# Patient Record
Sex: MALE | Race: WHITE | Employment: FULL TIME | URBAN - METROPOLITAN AREA
[De-identification: names, ages, dates, MRNs, and addresses within clinical notes are randomized per-mention and may not be internally consistent; named-entity substitution may affect disease eponyms.]

---

## 2018-03-07 ENCOUNTER — OFFICE VISIT (OUTPATIENT)
Dept: PULMONOLOGY | Age: 58
End: 2018-03-07

## 2018-03-07 VITALS
TEMPERATURE: 98.7 F | BODY MASS INDEX: 41.52 KG/M2 | OXYGEN SATURATION: 97 % | SYSTOLIC BLOOD PRESSURE: 140 MMHG | HEIGHT: 70 IN | RESPIRATION RATE: 18 BRPM | DIASTOLIC BLOOD PRESSURE: 82 MMHG | HEART RATE: 77 BPM | WEIGHT: 290 LBS

## 2018-03-07 DIAGNOSIS — G47.33 OSA (OBSTRUCTIVE SLEEP APNEA): Primary | ICD-10-CM

## 2018-03-07 PROCEDURE — 99243 OFF/OP CNSLTJ NEW/EST LOW 30: CPT | Performed by: NURSE PRACTITIONER

## 2018-03-07 RX ORDER — COVID-19 ANTIGEN TEST
KIT MISCELLANEOUS
COMMUNITY

## 2018-03-07 ASSESSMENT — SLEEP AND FATIGUE QUESTIONNAIRES
HOW LIKELY ARE YOU TO NOD OFF OR FALL ASLEEP WHILE LYING DOWN TO REST IN THE AFTERNOON WHEN CIRCUMSTANCES PERMIT: 2
HOW LIKELY ARE YOU TO NOD OFF OR FALL ASLEEP WHILE SITTING INACTIVE IN A PUBLIC PLACE: 0
HOW LIKELY ARE YOU TO NOD OFF OR FALL ASLEEP WHILE SITTING AND READING: 1
HOW LIKELY ARE YOU TO NOD OFF OR FALL ASLEEP WHILE SITTING AND TALKING TO SOMEONE: 0
HOW LIKELY ARE YOU TO NOD OFF OR FALL ASLEEP WHEN YOU ARE A PASSENGER IN A CAR FOR AN HOUR WITHOUT A BREAK: 0
NECK CIRCUMFERENCE (INCHES): 20
ESS TOTAL SCORE: 4
HOW LIKELY ARE YOU TO NOD OFF OR FALL ASLEEP WHILE SITTING QUIETLY AFTER LUNCH WITHOUT ALCOHOL: 0
HOW LIKELY ARE YOU TO NOD OFF OR FALL ASLEEP IN A CAR, WHILE STOPPED FOR A FEW MINUTES IN TRAFFIC: 0
HOW LIKELY ARE YOU TO NOD OFF OR FALL ASLEEP WHILE WATCHING TV: 1

## 2018-03-07 NOTE — PATIENT INSTRUCTIONS
Please keep all of your future appointments scheduled by [facility] Pulmonary office. Sleep Hygiene. .. Tips for better sleep. .. Avoid naps. This will ensure you are sleepy at bedtime. If you have to take a nap, sleep less than 1 hour, before 3 pm.  Sleep only when sleepy; this reduces the time you are awake in bed. Regular exercise is recommended to help you deepen your sleep, but not within 4-6 hours of your bedtime. Timing of exercise is important, aim to exercise early in the morning or early afternoon. A light snack may help you fall asleep. Warm milk and foods high in the amino acid tryptophan, such as bananas, may help you to sleep  Be sure to avoid heavy, spicy or sugary foods 4-6 hours before bedtime and avoid at snack time. Stay away from stimulants such as caffeine and nicotine for at least 4-6 hours before bed. Stimulants can interfere with your ability to fall asleep. Caffeine is found in tea, cola, coffee, cocoa and chocolate and is best avoided at bedtime. Nicotine is found in tobacco products. Avoid alcohol 4-6 hours before bedtime. Alcohol has an immediate sleep-inducing effect, after a few hours when alcohol levels fall there is a stimulant or wake-up effect and will cause fragmented sleep. Sleep rituals are important. Give your body clues it is time to slow down and sleep. Examples include; yoga, deep breathing, listen to relaxing music, a hot bath or a few minutes of reading. Have a fixed bedtime and awakening time, Even on weekends! You will feel better keeping a regular sleep cycle, even if you are retired or not working. Get into your favorite sleep position. If not asleep in 30 minutes, get up and do something boring until you feel sleepy. Remember not to expose yourself to bright lights such as TV, phone or tablet screens. Only use your bed for sleeping. Do not use your bed as an office, workroom or recreation room. Use comfortable bedding.

## 2018-03-07 NOTE — PROGRESS NOTES
[facility] Pul[facility]                                                                  CHIEF COMPLAINT: snoring, sleep    Consulting provider: self, CDL licence     HPI:   Diagnosed with severe ADONAY in 2011. 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 Currently, CPAP is set at [facility] and patient feels like the pressure is too low. Mask is old and leaks air. Needs new supplies and new machine as it is greater than 11years old and has no recording AHI. Patient is using CPAP 3.5 hrs/night with good benefit in sleeping more soundly, restorative sleep, and increased energy levels during the day. Using humidifier. No snoring on CPAP. Tolerating mask and pressure well. No mask fitting problem including leak. No significant daytime sleepiness. No dry nose or throat. No fatigue. No nocturia. Bedtime 5-530 pm and rise time is 1am, starts work at [employer]. Sleep onset<30 minutes. No TV in bedroom. 0-1 nocturia. Wakes up 2-3 times at night. It takes him 15 minutes to fall back a sleep. No nap during the day. Only wears on nights he has to work the next day (Sunday-Thrusday night), otherwise prefers to sleep prone.       Sleep Medicine 3/7/2018   Sitting and reading 1   Watching TV 1   Sitting, inactive in a public place (e.g. a theatre or a meeting) 0   As a passenger in a car for an hour without a break 0   Lying down to rest in the afternoon when circumstances permit 2   Sitting and talking to someone 0   Sitting quietly after a lunch without alcohol 0   In a car, while stopped for a few minutes in traffic 0   Total score 4   Neck circumference 20       Past Medical History:   Diagnosis Date    Depression     GERD (gastroesophageal reflux disease)     Hypertension        Past Surgical History:        Procedure Laterality Date    NASAL SINUS SURGERY      SEPTOPLASTY  9/28/11    septoplasty and bilateral inferior turbinate reduction    VASECTOMY      and reversal of vasectomy       Allergies:  has No Known Allergies. Social History:    TOBACCO:   reports that he has never smoked. He has never used smokeless tobacco.  ETOH:   reports that he does not drink alcohol. Family History:       Problem Relation Age of Onset    Cancer Father     Other Other      muscular dystrophy       Current Medications:    Current Outpatient Prescriptions:     Naproxen Sodium (ALEVE) 220 MG CAPS, Take by mouth, Disp: , Rfl:     propranolol (INDERAL) 10 MG tablet, TAKE ONE TABLET BY MOUTH TWICE DAILY, Disp: 180 tablet, Rfl: 3    DULoxetine (CYMBALTA) 60 MG capsule, Take 1 capsule by mouth daily (Patient taking differently: Take 60 mg by mouth 2 times daily ), Disp: 90 capsule, Rfl: 3    omeprazole (PRILOSEC) 20 MG capsule, Take 20 mg by mouth daily. , Disp: , Rfl:       REVIEW OF SYSTEMS:  Constitutional: Negative for fever  HENT: Negative for sore throat  Eyes: Negative for redness   Respiratory: Negative for dyspnea, cough  Cardiovascular: Negative for chest pain  Gastrointestinal: Negative for vomiting, diarrhea   Genitourinary: Negative for hematuria   Musculoskeletal: Negative for arthralgias   Skin: Negative for rash  Neurological: Negative for syncope  Hematological: Negative for adenopathy  Psychiatric/Behavorial: Negative for anxiety      Objective:   PHYSICAL EXAM:    Blood pressure (!) 140/82, pulse 77, temperature 98.7 °F (37.1 °C), temperature source Oral, resp. rate 18, height 5' 10\" (1.778 m), weight 290 lb (131.5 kg), SpO2 97 %.' on RA  Gen: No distress. Obese. BMI of 41.61  Eyes: PERRL. No sclera icterus. No conjunctival injection. ENT: No discharge. Pharynx clear. Mallampati class III  Neck: Trachea midline. No obvious mass. Neck circumference 20\"  Resp: No accessory muscle use. No crackles. No wheezes. No rhonchi. Good air entry. mortality.       Followup 31-90 days

## 2018-03-07 NOTE — LETTER
[facility] Pulmonary, Critical Care, and Sleep  [address] , [address] [place] [address]  Phone: [phone]  Fax: [phone]    Jt Mccrary CNP        March 7, 2018     Patient: Kalee Brand   YOB: 1960   Date of Visit: 3/7/2018       To Whom it May Concern:    Mark Anthony Nicholson was seen in my clinic on 3/7/2018 for consultation for Obstructive Sleep Apnea. If you have any questions or concerns, please don't hesitate to call.     Sincerely,         Jt Mccrary CNP

## 2018-04-30 ENCOUNTER — TELEPHONE (OUTPATIENT)
Dept: PULMONOLOGY | Age: 58
End: 2018-04-30

## 2018-04-30 DIAGNOSIS — G47.33 OSA (OBSTRUCTIVE SLEEP APNEA): Primary | ICD-10-CM

## 2018-06-04 ENCOUNTER — OFFICE VISIT (OUTPATIENT)
Dept: PULMONOLOGY | Age: 58
End: 2018-06-04

## 2018-06-04 VITALS
RESPIRATION RATE: 18 BRPM | DIASTOLIC BLOOD PRESSURE: 82 MMHG | HEIGHT: 70 IN | HEART RATE: 84 BPM | WEIGHT: 293 LBS | SYSTOLIC BLOOD PRESSURE: 128 MMHG | OXYGEN SATURATION: 96 % | BODY MASS INDEX: 41.95 KG/M2

## 2018-06-04 DIAGNOSIS — G47.33 OSA (OBSTRUCTIVE SLEEP APNEA): Primary | ICD-10-CM

## 2018-06-04 PROCEDURE — 99213 OFFICE O/P EST LOW 20 MIN: CPT | Performed by: NURSE PRACTITIONER

## 2018-06-04 ASSESSMENT — SLEEP AND FATIGUE QUESTIONNAIRES
HOW LIKELY ARE YOU TO NOD OFF OR FALL ASLEEP WHILE SITTING QUIETLY AFTER LUNCH WITHOUT ALCOHOL: 0
HOW LIKELY ARE YOU TO NOD OFF OR FALL ASLEEP WHEN YOU ARE A PASSENGER IN A CAR FOR AN HOUR WITHOUT A BREAK: 1
HOW LIKELY ARE YOU TO NOD OFF OR FALL ASLEEP WHILE SITTING AND READING: 0
HOW LIKELY ARE YOU TO NOD OFF OR FALL ASLEEP IN A CAR, WHILE STOPPED FOR A FEW MINUTES IN TRAFFIC: 0
HOW LIKELY ARE YOU TO NOD OFF OR FALL ASLEEP WHILE SITTING AND TALKING TO SOMEONE: 0
HOW LIKELY ARE YOU TO NOD OFF OR FALL ASLEEP WHILE LYING DOWN TO REST IN THE AFTERNOON WHEN CIRCUMSTANCES PERMIT: 2
HOW LIKELY ARE YOU TO NOD OFF OR FALL ASLEEP WHILE WATCHING TV: 0
HOW LIKELY ARE YOU TO NOD OFF OR FALL ASLEEP WHILE SITTING INACTIVE IN A PUBLIC PLACE: 0
NECK CIRCUMFERENCE (INCHES): 18.75
ESS TOTAL SCORE: 3

## 2019-06-03 ENCOUNTER — TELEPHONE (OUTPATIENT)
Dept: PULMONOLOGY | Age: 59
End: 2019-06-03

## 2019-06-03 ENCOUNTER — OFFICE VISIT (OUTPATIENT)
Dept: PULMONOLOGY | Age: 59
End: 2019-06-03
Payer: COMMERCIAL

## 2019-06-03 VITALS
BODY MASS INDEX: 41.95 KG/M2 | HEART RATE: 102 BPM | RESPIRATION RATE: 16 BRPM | OXYGEN SATURATION: 96 % | DIASTOLIC BLOOD PRESSURE: 84 MMHG | HEIGHT: 70 IN | SYSTOLIC BLOOD PRESSURE: 158 MMHG | TEMPERATURE: 98.7 F | WEIGHT: 293 LBS

## 2019-06-03 DIAGNOSIS — Z71.89 CPAP USE COUNSELING: ICD-10-CM

## 2019-06-03 DIAGNOSIS — E66.01 OBESITY, MORBID, BMI 40.0-49.9 (HCC): ICD-10-CM

## 2019-06-03 DIAGNOSIS — I10 ESSENTIAL HYPERTENSION: ICD-10-CM

## 2019-06-03 DIAGNOSIS — G47.33 SEVERE OBSTRUCTIVE SLEEP APNEA: Primary | ICD-10-CM

## 2019-06-03 DIAGNOSIS — R68.2 DRY MOUTH: ICD-10-CM

## 2019-06-03 PROCEDURE — 99214 OFFICE O/P EST MOD 30 MIN: CPT | Performed by: NURSE PRACTITIONER

## 2019-06-03 ASSESSMENT — SLEEP AND FATIGUE QUESTIONNAIRES
HOW LIKELY ARE YOU TO NOD OFF OR FALL ASLEEP WHILE SITTING QUIETLY AFTER LUNCH WITHOUT ALCOHOL: 0
HOW LIKELY ARE YOU TO NOD OFF OR FALL ASLEEP WHEN YOU ARE A PASSENGER IN A CAR FOR AN HOUR WITHOUT A BREAK: 0
HOW LIKELY ARE YOU TO NOD OFF OR FALL ASLEEP WHILE LYING DOWN TO REST IN THE AFTERNOON WHEN CIRCUMSTANCES PERMIT: 3
ESS TOTAL SCORE: 3
NECK CIRCUMFERENCE (INCHES): 18.75
HOW LIKELY ARE YOU TO NOD OFF OR FALL ASLEEP WHILE SITTING AND READING: 0
HOW LIKELY ARE YOU TO NOD OFF OR FALL ASLEEP IN A CAR, WHILE STOPPED FOR A FEW MINUTES IN TRAFFIC: 0
HOW LIKELY ARE YOU TO NOD OFF OR FALL ASLEEP WHILE WATCHING TV: 0
HOW LIKELY ARE YOU TO NOD OFF OR FALL ASLEEP WHILE SITTING INACTIVE IN A PUBLIC PLACE: 0
HOW LIKELY ARE YOU TO NOD OFF OR FALL ASLEEP WHILE SITTING AND TALKING TO SOMEONE: 0

## 2019-06-03 NOTE — PROGRESS NOTES
Orders verbalized by Catrachita Doherty CNP;  1 yr  Orders faxed to DME:[employer] Adjustment made to PAP Machine today per provider to: ACPAP 14, orders given to pt.   Phone note for 30 day compliance report

## 2019-06-03 NOTE — PATIENT INSTRUCTIONS
before bed: Having an empty stomach can interfere with your sleep. Dairy products and turkey contain tryptophan, which acts as a natural sleep inducer. 5- Stay away from caffeine, nicotine and alcohol at least 4-6 hours before bed: Caffeine and nicotine are stimulants that interfere with your ability to fall asleep. While alcohol has an immediate sleep-inducing effect, a few hours later, as alcohol levels in your blood start to fall, there is a stimulant effect and you will experience fragmented sleep. 6- Take a hot bath 90 minutes before bedtime:  A hot bath will raise your body temperature, but it is the drop in body temperature that may leave you feeling sleepy  7- Develop sleep rituals: it is important to give your body cues that it is time to slow down and sleep. Listen to relaxing music, read something soothing for 15 minutes, have a cup of caffeine free tea, or do relaxation exercises such as yoga or deep breathing help relieve anxiety and reduce muscle tension. 8- Fix a bedtime and an awakening time: Even on weekends! When your sleep cycle has a regular rhythm, you will feel better. 9- Sleep only when sleepy: This reduces the time you are awake in bed. 10- Get into your favorite sleeping position: If you can't fall asleep within 15-30 minutes, get up and do something boring until you feel sleepy. Sit quietly in the dark or read the warranty on your refrigerator. Don't expose yourself to bright light while you are up, it gives cues to your brain that it is time to wake up. 11- Only use your bed for sleeping: Dont use the bed as an office, workroom or recreation room. Let your body \"know\" that the bed is associated with sleeping  12- Use comfortable bedding. Uncomfortable bedding can prevent good sleep. Evaluate whether or not this is a source of your problem, and make appropriate changes. 13- Make sure your bed and bedroom are quiet and comfortable: A hot room can be uncomfortable.  A cooler room, along with enough blankets to stay warm is recommended. Get a blackout shade or wear a slumber mask and wear earplugs or get a \"white noise\" machine for light and noise distractions. 14- Use sunlight to set your biological clock: When you get up in the morning, go outside and turn your face to the sun for 15 minutes. 13- Dont take your worries to bed: Leave worries about job, school, daily life, etc., behind when you go to bed. Some people find it useful to assign a \"worry period\" during the evening or afternoon for these issues. CPAP Equipment Cleaning and Disinfecting Schedule  Equipment Cleaning Frequency Instructions  Disinfecting Frequency   Non-Disposable Filters  Weekly Mild soapy water, Rinse, Air Dry Not Required   Disposable Filters Change as needed  2-4 weeks Do Not Wash Not Required   Hose/tubing Daily Mild soapy water, Rinse, Air Dry Once a week   Mask / Nasal Pillows Daily Mild soapy water, Rinse, Air Dry Once a week   Headgear Weekly Hand wash, Mild soapy water, Rinse, Dry  Not Required   Humidifier Daily Empty water daily  Mild soapy water, Rinse well, Air Dry  Once a week   CPAP Unit As Needed Dust with damp cloth,  No detergents or sprays Not Required         Disinfect (per schedule) with 1 part white vinegar and 3 parts water- soak mask and water chamber for 30 minutes every 1-2 weeks, more often if sick. Allow water/vinegar mixture to run through tubing. Allow all equipment to air dry. Drying Hints:   Always hang tubing away from direct sunlight, as this will cause the tubing to become yellow, brittle and crack over a period of time. DO NOT attach the wet tubing to your CPAP unit to blow-dry it. The moisture from the tubing can drain back into your machine. Moisture in your unit can cause sudden pressure increases or short circuits  DO's and DON'Ts:  - Don't use alcohol-based products to clean your mask, because it can cause the materials to become hard and brittle.    - Don't put

## 2019-06-03 NOTE — PROGRESS NOTES
Patient ID: Gaurav Enamorado is a 62 y.o. male who is being seen today for   Chief Complaint   Patient presents with    Sleep Apnea     1 yr         HPI:     Gaurav Enamorado is a 62 y.o. male in office for ADONAY follow up. States had a cold in winter was off CPAP for awhile, now states he is getting a very dry mouth, states this is effecting his use. He states he did not have this problem previously. Patient is using CPAP 7-8 hrs/night. Using humidifier. No snoring on CPAP. The pressure feels a little light- he is requesting increase in pressure. The mask is comfortable-nasal mask. No mask leak. States he wants to try a full face mask. No significant daytime sleepiness. No nodding off when driving. +dry mouth. No fatigue. Bedtime is 5-530 pm and rise time is 1 am. Sleep onset is few minutes. Wakes up 0-1 times at night total. 0-1 nocturia. It takes few minutes to fall back a sleep. No naps during the day. No headache in am. 10 lbs weight gain. 2-3 caffienated beverages during the day. No alcohol. ESS is 3. Blood pressure is elevated in office today. Denies CP, vision change or Headache. States he is having tooth and shoulder pain.       Sleep Medicine 6/3/2019 6/4/2018 3/7/2018   Sitting and reading 0 0 1   Watching TV 0 0 1   Sitting, inactive in a public place (e.g. a theatre or a meeting) 0 0 0   As a passenger in a car for an hour without a break 0 1 0   Lying down to rest in the afternoon when circumstances permit 3 2 2   Sitting and talking to someone 0 0 0   Sitting quietly after a lunch without alcohol 0 0 0   In a car, while stopped for a few minutes in traffic 0 0 0   Total score 3 3 4   Neck circumference 18.75 18.75 20       Past Medical History:  Past Medical History:   Diagnosis Date    Depression     GERD (gastroesophageal reflux disease)     Hypertension     Sleep apnea        Past Surgical History:        Procedure Laterality Date    NASAL SINUS SURGERY      SEPTOPLASTY  9/28/11 septoplasty and bilateral inferior turbinate reduction    VASECTOMY      and reversal of vasectomy       Allergies:  has No Known Allergies. Social History:    TOBACCO:   reports that he has never smoked. He has never used smokeless tobacco.  ETOH:   reports that he does not drink alcohol. Family History:       Problem Relation Age of Onset    Cancer Father     Other Other         muscular dystrophy       Current Medications:    Current Outpatient Medications:     Naproxen Sodium (ALEVE) 220 MG CAPS, Take by mouth, Disp: , Rfl:     propranolol (INDERAL) 10 MG tablet, TAKE ONE TABLET BY MOUTH TWICE DAILY, Disp: 180 tablet, Rfl: 3    DULoxetine (CYMBALTA) 60 MG capsule, Take 1 capsule by mouth daily (Patient taking differently: Take 60 mg by mouth 2 times daily ), Disp: 90 capsule, Rfl: 3    omeprazole (PRILOSEC) 20 MG capsule, Take 20 mg by mouth daily. , Disp: , Rfl:       Objective:   PHYSICAL EXAM:    BP (!) 158/84 (Site: Left Upper Arm, Position: Sitting)   Pulse 102   Temp 98.7 °F (37.1 °C) (Oral)   Resp 16   Ht 5' 10\" (1.778 m)   Wt 293 lb (132.9 kg)   SpO2 96%   BMI 42.04 kg/m²     Physical exam:  Gen: No acute distress. Obese. BMI of 42.04  Eyes: PERRL. No sclera icterus. No conjunctival injection. ENT: No discharge. Pharynx clear. Mallampati class IV. Large tongue with ridging. Elongated uvula. Neck: Trachea midline. No obvious mass. Neck circumference 18.75\"  Resp: No accessory muscle use. No crackles. No wheezes. No rhonchi. CV: Regular rate. Regular rhythm. No murmur or rub. Skin: Warm and dry. M/S: No cyanosis. No obvious joint deformity. Neuro: Awake. Alert. Moves all four extremities. Psych: Oriented x 3. No anxiety.        DATA:   8/6/2011 split night sleep study AHI 80.2, low SPO2 81%, adequately controlled sleep apnea with CPAP 12 cm H2O  9/9/2011 titration-ADONAY controlled with CPAP 15 cm H2O    CPAP compliance data:  Compliance download report from 5/4/19 to 6/2/19 reviewed today by me and showed patient is using machine 5:36 hrs/night with 63.3% compliance and AHI 2.0 within this time frame. 19/30days with greater than 4 hours of machine use. CPAP 13 cm H20    Assessment:      · Severe ADONAY. CPAP 13 cm H2O. Suboptimal compliance on review today  · Obesity  · Snoring-resolved with CPAP  · Dry mouth with CPAP and chin strap  · Hypertension-blood pressure elevated in office today    Plan:     - Changed in office to CPAP 14 cm H2O  -Order heated tubing. Recommend full facemask if does not improve with heated tubing  -[place] [facility] [place]  - Advised to use CPAP 6-8 hrs at night and during naps-need to increase use. - Replacement of mask, tubing, head straps every 3-6 months or sooner if damaged. - Patient instructed to contact DME company for any mask, tubing or machine trouble shooting if problems arise.  - Sleep hygiene  - Avoid sedatives, alcohol and caffeinated drinks at bed time. - Patient counseled to never drive or operate heavy machinery while fatigue, drowsy or sleepy or if has not used CPAP at least 4 hours night prior-patient verbalized understanding and agrees.    - Weight loss is recommended as a long-term intervention.    - Complications of ADONAY if not treated were discussed with patient patient, including: systemic hypertension, pulmonary hypertension, cardiovascular morbidities, car accidents and all cause mortality.  -Patient education handout provided regarding sleep tips and CPAP cleaning recommendations   -Follow up with PCP if blood pressure continues elevated    Follow up: 1 year (pt preference), sooner if needed

## 2019-12-11 ENCOUNTER — OFFICE VISIT (OUTPATIENT)
Dept: ORTHOPEDIC SURGERY | Age: 59
End: 2019-12-11
Payer: COMMERCIAL

## 2019-12-11 VITALS
DIASTOLIC BLOOD PRESSURE: 81 MMHG | SYSTOLIC BLOOD PRESSURE: 127 MMHG | HEIGHT: 70 IN | WEIGHT: 292.99 LBS | HEART RATE: 87 BPM | BODY MASS INDEX: 41.95 KG/M2

## 2019-12-11 DIAGNOSIS — M76.31 IT BAND SYNDROME, RIGHT: ICD-10-CM

## 2019-12-11 DIAGNOSIS — G57.01 PIRIFORMIS SYNDROME OF RIGHT SIDE: ICD-10-CM

## 2019-12-11 DIAGNOSIS — M25.551 PAIN OF RIGHT HIP JOINT: Primary | ICD-10-CM

## 2019-12-11 DIAGNOSIS — S33.6XXA SPRAIN OF SACROILIAC JOINT, INITIAL ENCOUNTER: ICD-10-CM

## 2019-12-11 PROCEDURE — 99203 OFFICE O/P NEW LOW 30 MIN: CPT | Performed by: PHYSICIAN ASSISTANT

## 2019-12-11 RX ORDER — LISINOPRIL 10 MG/1
1 TABLET ORAL
COMMUNITY
Start: 2019-10-25

## 2019-12-11 RX ORDER — CYCLOBENZAPRINE HCL 10 MG
10 TABLET ORAL 3 TIMES DAILY PRN
Qty: 30 TABLET | Refills: 0 | Status: SHIPPED | OUTPATIENT
Start: 2019-12-11 | End: 2019-12-21

## 2019-12-11 RX ORDER — METHYLPREDNISOLONE 4 MG/1
TABLET ORAL
Qty: 21 KIT | Refills: 0 | Status: SHIPPED | OUTPATIENT
Start: 2019-12-11

## 2019-12-16 ENCOUNTER — OFFICE VISIT (OUTPATIENT)
Dept: ORTHOPEDIC SURGERY | Age: 59
End: 2019-12-16
Payer: COMMERCIAL

## 2019-12-16 VITALS — BODY MASS INDEX: 41.95 KG/M2 | WEIGHT: 292.99 LBS | HEIGHT: 70 IN

## 2019-12-16 DIAGNOSIS — M51.36 DDD (DEGENERATIVE DISC DISEASE), LUMBAR: Primary | ICD-10-CM

## 2019-12-16 PROCEDURE — 99213 OFFICE O/P EST LOW 20 MIN: CPT | Performed by: ORTHOPAEDIC SURGERY

## 2019-12-18 ENCOUNTER — TELEPHONE (OUTPATIENT)
Dept: ORTHOPEDIC SURGERY | Age: 59
End: 2019-12-18

## 2020-05-11 ENCOUNTER — TELEPHONE (OUTPATIENT)
Dept: PULMONOLOGY | Age: 60
End: 2020-05-11

## 2021-04-28 ENCOUNTER — VIRTUAL VISIT (OUTPATIENT)
Dept: PULMONOLOGY | Age: 61
End: 2021-04-28
Payer: COMMERCIAL

## 2021-04-28 ENCOUNTER — TELEPHONE (OUTPATIENT)
Dept: PULMONOLOGY | Age: 61
End: 2021-04-28

## 2021-04-28 DIAGNOSIS — Z71.89 CPAP USE COUNSELING: ICD-10-CM

## 2021-04-28 DIAGNOSIS — G47.33 SEVERE OBSTRUCTIVE SLEEP APNEA: Primary | ICD-10-CM

## 2021-04-28 DIAGNOSIS — R68.2 DRY MOUTH: ICD-10-CM

## 2021-04-28 PROCEDURE — 99442 PR PHYS/QHP TELEPHONE EVALUATION 11-20 MIN: CPT | Performed by: NURSE PRACTITIONER

## 2021-04-28 ASSESSMENT — SLEEP AND FATIGUE QUESTIONNAIRES
HOW LIKELY ARE YOU TO NOD OFF OR FALL ASLEEP WHILE SITTING INACTIVE IN A PUBLIC PLACE: 0
HOW LIKELY ARE YOU TO NOD OFF OR FALL ASLEEP IN A CAR, WHILE STOPPED FOR A FEW MINUTES IN TRAFFIC: 0
HOW LIKELY ARE YOU TO NOD OFF OR FALL ASLEEP WHILE SITTING AND TALKING TO SOMEONE: 0
HOW LIKELY ARE YOU TO NOD OFF OR FALL ASLEEP WHEN YOU ARE A PASSENGER IN A CAR FOR AN HOUR WITHOUT A BREAK: 0
HOW LIKELY ARE YOU TO NOD OFF OR FALL ASLEEP WHILE LYING DOWN TO REST IN THE AFTERNOON WHEN CIRCUMSTANCES PERMIT: 2
HOW LIKELY ARE YOU TO NOD OFF OR FALL ASLEEP WHILE SITTING AND READING: 0

## 2021-04-28 NOTE — PATIENT INSTRUCTIONS
Sleep Hygiene. .. Tips for better sleep. .. Avoid naps. This will ensure you are sleepy at bedtime. If you have to take a nap, sleep less than 1 hour, before 3 pm.  Sleep only when sleepy; this reduces the time you are awake in bed. Regular exercise is recommended to help you deepen your sleep, but not within 4-6 hours of your bedtime. Timing of exercise is important, aim to exercise early in the morning or early afternoon. A light snack may help you fall asleep. Warm milk and foods high in the amino acid tryptophan, such as bananas, may help you to sleep  Be sure to avoid heavy, spicy or sugary foods 4-6 hours before bedtime and avoid at snack time. Stay away from stimulants such as caffeine and nicotine for at least 4-6 hours before bed. Stimulants can interfere with your ability to fall asleep. Caffeine is found in tea, cola, coffee, cocoa and chocolate and is best avoided at bedtime. Nicotine is found in tobacco products. Avoid alcohol 4-6 hours before bedtime. Alcohol has an immediate sleep-inducing effect, after a few hours when alcohol levels fall there is a stimulant or wake-up effect and will cause fragmented sleep. Sleep rituals are important. Give your body clues it is time to slow down and sleep. Examples include; yoga, deep breathing, listen to relaxing music, a hot bath or a few minutes of reading. Have a fixed bedtime and awakening time, Even on weekends! You will feel better keeping a regular sleep cycle, even if you are retired or not working. Get into your favorite sleep position. If not asleep in 30 minutes, get up and do something boring until you feel sleepy. Remember not to expose yourself to bright lights such as TV, phone or tablet screens. Only use your bed for sleeping. Do not use your bed as an office, workroom or recreation room. Use comfortable bedding. Uncomfortable bedding can prevent good sleep. Ensure your bedroom is quiet and comfortable.  A cooler room along with recommended cleaning schedule. - Do change your disposable filter frequently. Adapted From: [employer]/cleaning. shtm.   These are general suggestions for all models please follow specific s recommendations and specific instructions

## 2021-04-28 NOTE — TELEPHONE ENCOUNTER
Within this Telehealth Consent, the terms you and yours refer to the person using the Telehealth Service (Service), or in the case of a use of the Service by or on behalf of a minor, you and yours refer to and include (i) the parent or legal guardian who provides consent to the use of the Service by such minor or uses the Service on behalf of such minor, and (ii) the minor for whom consent is being provided or on whose behalf the Service is being utilized. When using Service, you will be consulting with your health care providers via the use of Telehealth.   Telehealth involves the delivery of healthcare services using electronic communications, information technology or other means between a healthcare provider and a patient who are not in the same physical location. Telehealth may be used for diagnosis, treatment, follow-up and/or patient education, and may include, but is not limited to, one or more of the following:    Electronic transmission of medical records, photo images, personal health information or other data between a patient and a healthcare provider    Interactions between a patient and healthcare provider via audio, video and/or data communications    Use of output data from medical devices, sound and video files    Anticipated Benefits   The use of Telehealth by your Provider(s) through the Service may have the following possible benefits:    Making it easier and more efficient for you to access medical care and treatment for the conditions treated by such Provider(s) utilizing the Service    Allowing you to obtain medical care and treatment by Provider(s) at times that are convenient for you    Enabling you to interact with Provider(s) without the necessity of an in-office appointment     Possible Risks   While the use of Telehealth can provide potential benefits for you, there are also potential risks associated with the use of Telehealth.  These risks include, but may not be limited to the following:    Your Provider(s) may not able to provide medical treatment for your particular condition and you may be required to seek alternative healthcare or emergency care services.  The electronic systems or other security protocols or safeguards used in the Service could fail, causing a breach of privacy of your medical or other information.  Given regulatory requirements in certain jurisdictions, your Provider(s) diagnosis and/or treatment options, especially pertaining to certain prescriptions, may be limited. Acceptance   1. You understand that Services will be provided via Telehealth. This process involves the use of HIPAA compliant and secure, real-time audio-visual interfacing with a qualified and appropriately trained provider located at [facility]. 2. You understand that, under no circumstances, will this session be recorded. 3. You understand that the Provider(s) at [facility] and other clinical participants will be party to the information obtained during the Telehealth session in accordance with best medical practices. 4. You understand that the information obtained during the Telehealth session will be used to help determine the most appropriate treatment options. 5. You understand that You have the right to revoke this consent at any point in time. 6. You understand that Telehealth is voluntary, and that continued treatment is not dependent upon consent. 7. You understand that, in the event of non-consent to Telehealth services and/or technical difficulties, you will obtain services as typically provided in the absence of Telehealth technology. 8. You understand that this consent will be kept in Your medical record. 9. No potential benefits from the use of Telehealth or specific results can be guaranteed. Your condition may not be cured or improved and, in some cases, may get worse.    10. There are limitations in the provision of medical care and treatment via Telehealth and the Service and you may not be able to receive diagnosis and/or treatment through the Service for every condition for which you seek diagnosis and/or treatment. 11. There are potential risks to the use of Telehealth, including but not limited to the risks described in this Telehealth Consent. 12. Your Provider(s) have discussed the use of Telehealth and the Service with you, including the benefits and risks of such and you have provided oral consent to your Provider(s) for the use of Telehealth and the Service. 15. You understand that it is your duty to provide your Provider(s) truthful, accurate and complete information, including all relevant information regarding care that you may have received or may be receiving from other healthcare providers outside of the Service. 14. You understand that each of your Provider(s) may determine in his or sole discretion that your condition is not suitable for diagnosis and/or treatment using the Service, and that you may need to seek medical care and treatment a specialist or other healthcare provider, outside of the Service. 15. You understand that you are fully responsible for payment for all services provided by Provider(s) or through use of the Service and that you may not be able to use third-party insurance. 16. You represent that (a) you have read this Telehealth Consent carefully, (b) you understand the risks and benefits of the Service and the use of Telehealth in the medical care and treatment provided to you by Provider(s) using the Service, and (c) you have the legal capacity and authority to provide this consent for yourself and/or the minor for which you are consenting under applicable federal and state laws, including laws relating to the age of [de-identified] and/or parental/guardian consent.    17. You give your informed consent to the use of Telehealth by Provider(s) using the Service under

## 2021-04-28 NOTE — PROGRESS NOTES
Patient ID: Mary Albarado is a 59 y.o. male who is being seen today for   Chief Complaint   Patient presents with    Sleep Apnea     sleep f/u         HPI:     Mary Albarado is a 59 y.o. male for telephone only doxy. me virtual visit for ADONAY follow up. STates he is not doing as well with CPAP. States he is getting really dry mouth and throat. States he did try to increase humidification but then gets condensation in tubing. STates he does not have heated tubing. Patient is using CPAP 4-6 hrs/night. Using humidifier. No snoring on CPAP. The pressure feels light. The mask is comfortable- full face. No mask leak. No significant daytime sleepiness. No nodding off when driving. No fatigue. Bedtime is  5-530 pm and rise time is 2 am for work. Sleep onset is usually few-10 minutes. Wakes up 1-2 times at night total. 1-2 nocturia. It takes few minutes to fall back a sleep. No naps during the day. No headache in am. No weight gain. 2 caffienated beverages during the day. No alcohol. ESS is 3.       Sleep Medicine 4/28/2021 6/3/2019 6/4/2018 3/7/2018   Sitting and reading 0 0 0 1   Watching TV 1 0 0 1   Sitting, inactive in a public place (e.g. a theatre or a meeting) 0 0 0 0   As a passenger in a car for an hour without a break 0 0 1 0   Lying down to rest in the afternoon when circumstances permit 2 3 2 2   Sitting and talking to someone 0 0 0 0   Sitting quietly after a lunch without alcohol 0 0 0 0   In a car, while stopped for a few minutes in traffic 0 0 0 0   Total score 3 3 3 4   Neck circumference - 18.75 18.75 20       Past Medical History:  Past Medical History:   Diagnosis Date    Depression     GERD (gastroesophageal reflux disease)     Hypertension     Sleep apnea        Past Surgical History:        Procedure Laterality Date    NASAL SINUS SURGERY      SEPTOPLASTY  9/28/11    septoplasty and bilateral inferior turbinate reduction    VASECTOMY      and reversal of vasectomy       Allergies:  has No Known Allergies. Social History:    TOBACCO:   reports that he has never smoked. He has never used smokeless tobacco.  ETOH:   reports no history of alcohol use. Family History:       Problem Relation Age of Onset    Cancer Father     Other Other         muscular dystrophy       Current Medications:    Current Outpatient Medications:     lisinopril (PRINIVIL;ZESTRIL) 10 MG tablet, Take 1 tablet by mouth, Disp: , Rfl:     Naproxen Sodium (ALEVE) 220 MG CAPS, Take by mouth, Disp: , Rfl:     DULoxetine (CYMBALTA) 60 MG capsule, Take 1 capsule by mouth daily (Patient taking differently: Take 60 mg by mouth 2 times daily ), Disp: 90 capsule, Rfl: 3    omeprazole (PRILOSEC) 20 MG capsule, Take 20 mg by mouth daily. , Disp: , Rfl:     methylPREDNISolone (MEDROL, JAN,) 4 MG tablet, Use as directed (Patient not taking: Reported on 4/28/2021), Disp: 21 kit, Rfl: 0    propranolol (INDERAL) 10 MG tablet, TAKE ONE TABLET BY MOUTH TWICE DAILY (Patient not taking: Reported on 4/28/2021), Disp: 180 tablet, Rfl: 3      Objective:   PHYSICAL EXAM:    There were no vitals taken for this visit. Physical exam:      DATA:   8/6/2011 split night sleep study AHI 80.2, low SPO2 81%, adequately controlled sleep apnea with CPAP 12 cm H2O  9/9/2011 titrationADONAY controlled with CPAP 15 cm H2O    CPAP compliance data:  Compliance download report from 5/4/19 to 6/2/19 reviewed today by me and showed patient is using machine 5:36 hrs/night with 63.3% compliance and AHI 2.0 within this time frame. 19/30days with greater than 4 hours of machine use. CPAP 13 cm H20    Compliance download report from 3/25/21 to 4/23/21 reviewed today by me and showed patient is using machine 6:11 hrs/night with 46% compliance and AHI 1.3 within this time frame. 14/30days with greater than 4 hours of machine use. CPAP 14 cm H20       Assessment:      · Severe ADONAY. CPAP 14 cm H2O.   Suboptimal compliance on review today  · Obesity  · Snoringresolved with CPAP  · Dry mouth with CPAP   · Hypertension    Plan:     -Send order to change to CPAP 15 cm H2O  -Order heated tubing. Discussed increasing humidification, humidifier in room, moisturizing mouthwash to help with dry mouth  -Discussed severity of sleep apnea and importance of treatment/CPAP use  - Advised to use CPAP 6-8 hrs at night and during naps-need to increase use. - Replacement of mask, tubing, head straps every 3-6 months or sooner if damaged. - Patient instructed to contact "[employer] company for any mask, tubing or machine trouble shooting if problems arise.  - Sleep hygiene  - Avoid sedatives, alcohol and caffeinated drinks at bed time. - Patient counseled to never drive or operate heavy machinery while fatigue, drowsy or sleepy or if has not used CPAP at least 4 hours night prior-patient verbalized understanding and agrees. - Weight loss is recommended as a long-term intervention.    - Complications of ADONAY if not treated were discussed with patient patient, including: systemic hypertension, pulmonary hypertension, cardiovascular morbidities, car accidents and all cause mortality.  -Patient education  provided regarding sleep tips and CPAP cleaning recommendations       Follow up: 1 year (pt preference), sooner if needed    Shivani Jordan is a 59 y.o. male evaluated via telephone on 4/28/2021. Consent:  He and/or health care decision maker is aware that that he may receive a bill for this telephone service, depending on his insurance coverage, and has provided verbal consent to proceed: Yes    I affirm this is a Patient Initiated Episode with a Patient who has not had a related appointment within my department in the past 7 days or scheduled within the next 24 hours.     Patient identification was verified at the start of the visit: Yes    Total Time: minutes: 11-20 minutes    The visit was conducted pursuant to the emergency declaration under the [address] Emergencies Act, [facility] waiver authority and the Coronavirus Preparedness and Response Supplemental Appropriations Act. Patient identification was verified, and a caregiver was present when appropriate. The patient was located in a state where the provider was credentialed to provide care.     Note: not billable if this call serves to triage the patient into an appointment for the relevant concern      Gwenyth Lanes

## 2021-06-03 NOTE — TELEPHONE ENCOUNTER
CPAP download report from 4/30/2021-5/30/2021 on CPAP 15 cm H2O reviewed. Compliance is adequate 71%. AHI is good 1.5.

## 2022-05-20 ENCOUNTER — TELEPHONE (OUTPATIENT)
Dept: PULMONOLOGY | Age: 62
End: 2022-05-20

## 2022-05-20 ENCOUNTER — SCHEDULED TELEPHONE ENCOUNTER (OUTPATIENT)
Dept: PULMONOLOGY | Age: 62
End: 2022-05-20
Payer: COMMERCIAL

## 2022-05-20 DIAGNOSIS — G47.33 SEVERE OBSTRUCTIVE SLEEP APNEA: Primary | ICD-10-CM

## 2022-05-20 DIAGNOSIS — Z71.89 CPAP USE COUNSELING: ICD-10-CM

## 2022-05-20 DIAGNOSIS — E66.01 OBESITY, MORBID, BMI 40.0-49.9 (HCC): ICD-10-CM

## 2022-05-20 PROCEDURE — 99442 PR PHYS/QHP TELEPHONE EVALUATION 11-20 MIN: CPT | Performed by: NURSE PRACTITIONER

## 2022-05-20 ASSESSMENT — SLEEP AND FATIGUE QUESTIONNAIRES
HOW LIKELY ARE YOU TO NOD OFF OR FALL ASLEEP WHEN YOU ARE A PASSENGER IN A CAR FOR AN HOUR WITHOUT A BREAK: 1
HOW LIKELY ARE YOU TO NOD OFF OR FALL ASLEEP WHILE SITTING INACTIVE IN A PUBLIC PLACE: 0
HOW LIKELY ARE YOU TO NOD OFF OR FALL ASLEEP WHILE SITTING AND READING: 0
HOW LIKELY ARE YOU TO NOD OFF OR FALL ASLEEP WHILE SITTING AND TALKING TO SOMEONE: 0
HOW LIKELY ARE YOU TO NOD OFF OR FALL ASLEEP WHILE SITTING QUIETLY AFTER LUNCH WITHOUT ALCOHOL: 0
HOW LIKELY ARE YOU TO NOD OFF OR FALL ASLEEP WHILE WATCHING TV: 0
ESS TOTAL SCORE: 1
HOW LIKELY ARE YOU TO NOD OFF OR FALL ASLEEP WHILE LYING DOWN TO REST IN THE AFTERNOON WHEN CIRCUMSTANCES PERMIT: 0
HOW LIKELY ARE YOU TO NOD OFF OR FALL ASLEEP IN A CAR, WHILE STOPPED FOR A FEW MINUTES IN TRAFFIC: 0

## 2022-05-20 NOTE — PROGRESS NOTES
Patient ID: Marla Chawla is a 57 y.o. male who is being seen today for   Chief Complaint   Patient presents with    1 Year Follow Up     ADONAY         HPI:   Marla Chawla is a 57 y.o. male for telephone only virtual visit for ADONAY follow up. States he is doing okay with CPAP. Patient states he has been using his older CPAP due to current CPAP is on recall. States he has not registered his CPAP for the recall. States does have a dry mouth. States he is has been using his old CPAP due to the recall. Patient is using CPAP 6-7 hrs/night. Using humidifier. No snoring on CPAP. The pressure is well tolerated. The mask is comfortable- full face. No mask leak. No significant daytime sleepiness. No nodding off when driving. No fatigue. States shift varies. Bedtime varies and rise time is 1130 pm- [place]. Sleep onset is 15-20 minutes. Wakes up 1-2 times at night total. 1nocturia. It takes few minutes to fall back a sleep. naps during the day. No headache in am. No weight gain. No caffienated beverages during the day. No alcohol. ESS is 1.       Sleep Medicine 5/20/2022 4/28/2021 6/3/2019 6/4/2018 3/7/2018   Sitting and reading 0 0 0 0 1   Watching TV 0 1 0 0 1   Sitting, inactive in a public place (e.g. a theatre or a meeting) 0 0 0 0 0   As a passenger in a car for an hour without a break 1 0 0 1 0   Lying down to rest in the afternoon when circumstances permit 0 2 3 2 2   Sitting and talking to someone 0 0 0 0 0   Sitting quietly after a lunch without alcohol 0 0 0 0 0   In a car, while stopped for a few minutes in traffic 0 0 0 0 0   Total score 1 3 3 3 4   Neck circumference (Inches) - - 18.75 18.75 20       Past Medical History:  Past Medical History:   Diagnosis Date    Depression     GERD (gastroesophageal reflux disease)     Hypertension     Sleep apnea        Past Surgical History:        Procedure Laterality Date    NASAL SINUS SURGERY      SEPTOPLASTY  9/28/11    septoplasty and bilateral inferior turbinate reduction    VASECTOMY      and reversal of vasectomy       Allergies:  has No Known Allergies. Social History:    TOBACCO:   reports that he has never smoked. He has never used smokeless tobacco.  ETOH:   reports no history of alcohol use. Family History:       Problem Relation Age of Onset    Cancer Father     Other Other         muscular dystrophy       Current Medications:    Current Outpatient Medications:     lisinopril (PRINIVIL;ZESTRIL) 10 MG tablet, Take 1 tablet by mouth, Disp: , Rfl:     methylPREDNISolone (MEDROL, JAN,) 4 MG tablet, Use as directed, Disp: 21 kit, Rfl: 0    Naproxen Sodium (ALEVE) 220 MG CAPS, Take by mouth, Disp: , Rfl:     DULoxetine (CYMBALTA) 60 MG capsule, Take 1 capsule by mouth daily (Patient taking differently: Take 60 mg by mouth 2 times daily ), Disp: 90 capsule, Rfl: 3    propranolol (INDERAL) 10 MG tablet, TAKE ONE TABLET BY MOUTH TWICE DAILY (Patient not taking: Reported on 4/28/2021), Disp: 180 tablet, Rfl: 3    omeprazole (PRILOSEC) 20 MG capsule, Take 20 mg by mouth daily. (Patient not taking: Reported on 5/20/2022), Disp: , Rfl:       Objective:   PHYSICAL EXAM:    There were no vitals taken for this visit. Physical exam:      DATA:   8/6/2011 split night sleep study AHI 80.2, low SPO2 81%, adequately controlled sleep apnea with CPAP 12 cm H2O  9/9/2011 titration-ADONAY controlled with CPAP 15 cm H2O    CPAP compliance data:  Compliance download report from 5/4/19 to 6/2/19 reviewed today by me and showed patient is using machine 5:36 hrs/night with 63.3% compliance and AHI 2.0 within this time frame. 19/30days with greater than 4 hours of machine use. CPAP 13 cm H20    Compliance download report from 3/25/21 to 4/23/21 reviewed today by me and showed patient is using machine 6:11 hrs/night with 46% compliance and AHI 1.3 within this time frame. 14/30days with greater than 4 hours of machine use.   CPAP 14 cm H20     5/20/2022-unable to obtain CPAP download report. Patient has been using his older CPAP with no modem. Assessment:      · Severe ADONAY. CPAP 15 cm H2O. compliant per patient, symptoms appear controlled  · Obesity  · Snoring-resolved with CPAP  · Dry mouth with CPAP   · Hypertension    Plan:     - CPAP 15 cm H2O  -Order heated tubing. Discussed increasing humidification, humidifier in room, moisturizing mouthwash to help with dry mouth  -Request download report  -Discussed severity of sleep apnea and importance of treatment/CPAP use  - Advised to use CPAP 6-8 hrs at night and during naps-need to increase use. - Replacement of mask, tubing, head straps every 3-6 months or sooner if damaged. - Patient instructed to contact [employer] company for any mask, tubing or machine trouble shooting if problems arise.  - Sleep hygiene  - Avoid sedatives, alcohol and caffeinated drinks at bed time. - Patient counseled to never drive or operate heavy machinery while fatigue, drowsy or sleepy or if has not used CPAP at least 4 hours night prior-patient verbalized understanding and agrees. - Weight loss is recommended as a long-term intervention.    - Complications of ADONAY if not treated were discussed with patient patient, including: systemic hypertension, pulmonary hypertension, cardiovascular morbidities, car accidents and all cause mortality.  -Patient education  provided regarding sleep tips and CPAP cleaning recommendations     -Discussed [employer] recently recalled some Pap units. Patient encouraged to call DME  to see if his unit is on recall and register it if so. Patient states he has been using older CPAP however the CPAP may be on recall as well. Patient instructed to give both serial numbers when he calls the . Discussed risks/benefits of untreated sleep apnea as well as risks/benefits of use of unit if recalled.      At this time, if patients have moderate to severe sleep apnea or have severe daytime sleepiness, it is recommended continue therapy until the machine can be replaced. Based upon the information we have so far, it appears the risk of stopping therapy may be higher than the risks associated with foam degradation. However, there are still many unknown variables and each patient will have to make a final determination on his or her own. Patient states he plans to continue to use CPAP until he can get a replacement    Please only use cleaning methods recommended by . Follow up: 1 year, sooner if needed    Cathie Manzanares is a 57 y.o. male evaluated via telephone on 5/20/2022 for 1 Year Follow Up (ADONAY)  . Total Time: minutes: 11-20 minutes    Alen Saucedo was evaluated through a synchronous (real-time) audio encounter. Patient identification was verified at the start of the visit. He (or guardian if applicable) is aware that this is a billable service, which includes applicable co-pays. This visit was conducted with the patient's (and/or legal guardian's) verbal consent. He has not had a related appointment within my department in the past 7 days or scheduled within the next 24 hours. The patient was located in a state where the provider was licensed to provide care.     Note: not billable if this call serves to triage the patient into an appointment for the relevant concern    Neville Bradford, JOON - CNP

## 2022-05-20 NOTE — PATIENT INSTRUCTIONS
enough blankets to stay warm is recommended. If your room is too noisy, try a white noise machine. If too bright, try black out shades or an eye mask. Dont take worries to bed. Leave worries about work, school etc. behind you when you go to bed. Some people find it helpful to assign a worry period in the evening or late afternoon to write down your worries and get them out of your system. CPAP Equipment Cleaning and Disinfecting Schedule  Equipment Cleaning Frequency Instructions  Disinfecting Frequency   Non-Disposable Filters  Weekly Mild soapy water, Rinse, Air Dry Not Required   Disposable Filters Change as needed  2-4 weeks Do Not Wash Not Required   Hose/tubing Daily Mild soapy water, Rinse, Air Dry Once a week   Mask / Nasal Pillows Daily Mild soapy water, Rinse, Air Dry Once a week   Headgear Weekly Hand wash, Mild soapy water, Rinse, Dry  Not Required   Humidifier Daily Empty water daily  Mild soapy water, Rinse well, Air Dry  Once a week   CPAP Unit As Needed Dust with damp cloth,  No detergents or sprays Not Required         Disinfect (per schedule) with 1 part white vinegar and 3 parts water- soak mask and water chamber for 30 minutes every 1-2 weeks, more often if sick. Allow water/vinegar mixture to run through tubing. Allow all equipment to air dry. Drying Hints:   Always hang tubing away from direct sunlight, as this will cause the tubing to become yellow, brittle and crack over a period of time. DO NOT attach the wet tubing to your CPAP unit to blow-dry it. The moisture from the tubing can drain back into your machine. Moisture in your unit can cause sudden pressure increases or short circuits  DO's and DON'Ts:  - Don't use alcohol-based products to clean your mask, because it can cause the materials to become hard and brittle.    - Don't put headgear in the washer or dryer  - Don't use any caustic or household cleaning solutions such as bleach on your CPAP   equipment.  - Do follow the recommended cleaning schedule. - Do change your disposable filter frequently. Adapted From: [email or web address]. shtm.   These are general suggestions for all models please follow specific s recommendations and specific instructions    Pt was informed that per [employer] website they should call [phone] to see if their machine is included in the recall

## 2022-05-20 NOTE — TELEPHONE ENCOUNTER
Need a download of of the patients machine.  Will need to know if the patient is using dreamstation or system 1

## 2022-05-20 NOTE — TELEPHONE ENCOUNTER
Within this Telehealth Consent, the terms you and yours refer to the person using the Telehealth Service (Service), or in the case of a use of the Service by or on behalf of a minor, you and yours refer to and include (i) the parent or legal guardian who provides consent to the use of the Service by such minor or uses the Service on behalf of such minor, and (ii) the minor for whom consent is being provided or on whose behalf the Service is being utilized. When using Service, you will be consulting with your health care providers via the use of Telehealth.   Telehealth involves the delivery of healthcare services using electronic communications, information technology or other means between a healthcare provider and a patient who are not in the same physical location. Telehealth may be used for diagnosis, treatment, follow-up and/or patient education, and may include, but is not limited to, one or more of the following:    Electronic transmission of medical records, photo images, personal health information or other data between a patient and a healthcare provider    Interactions between a patient and healthcare provider via audio, video and/or data communications    Use of output data from medical devices, sound and video files    Anticipated Benefits   The use of Telehealth by your Provider(s) through the Service may have the following possible benefits:    Making it easier and more efficient for you to access medical care and treatment for the conditions treated by such Provider(s) utilizing the Service    Allowing you to obtain medical care and treatment by Provider(s) at times that are convenient for you    Enabling you to interact with Provider(s) without the necessity of an in-office appointment     Possible Risks   While the use of Telehealth can provide potential benefits for you, there are also potential risks associated with the use of Telehealth.  These risks include, but may not be limited to the following:    Your Provider(s) may not able to provide medical treatment for your particular condition and you may be required to seek alternative healthcare or emergency care services.  The electronic systems or other security protocols or safeguards used in the Service could fail, causing a breach of privacy of your medical or other information.  Given regulatory requirements in certain jurisdictions, your Provider(s) diagnosis and/or treatment options, especially pertaining to certain prescriptions, may be limited. Acceptance   1. You understand that Services will be provided via Telehealth. This process involves the use of HIPAA compliant and secure, real-time audio-visual interfacing with a qualified and appropriately trained provider located at [facility]. 2. You understand that, under no circumstances, will this session be recorded. 3. You understand that the Provider(s) at [facility] and other clinical participants will be party to the information obtained during the Telehealth session in accordance with best medical practices. 4. You understand that the information obtained during the Telehealth session will be used to help determine the most appropriate treatment options. 5. You understand that You have the right to revoke this consent at any point in time. 6. You understand that Telehealth is voluntary, and that continued treatment is not dependent upon consent. 7. You understand that, in the event of non-consent to Telehealth services and/or technical difficulties, you will obtain services as typically provided in the absence of Telehealth technology. 8. You understand that this consent will be kept in Your medical record. 9. No potential benefits from the use of Telehealth or specific results can be guaranteed. Your condition may not be cured or improved and, in some cases, may get worse.    10. There are limitations in the provision of medical care and treatment via Telehealth and the Service and you may not be able to receive diagnosis and/or treatment through the Service for every condition for which you seek diagnosis and/or treatment. 11. There are potential risks to the use of Telehealth, including but not limited to the risks described in this Telehealth Consent. 12. Your Provider(s) have discussed the use of Telehealth and the Service with you, including the benefits and risks of such and you have provided oral consent to your Provider(s) for the use of Telehealth and the Service. 15. You understand that it is your duty to provide your Provider(s) truthful, accurate and complete information, including all relevant information regarding care that you may have received or may be receiving from other healthcare providers outside of the Service. 14. You understand that each of your Provider(s) may determine in his or sole discretion that your condition is not suitable for diagnosis and/or treatment using the Service, and that you may need to seek medical care and treatment a specialist or other healthcare provider, outside of the Service. 15. You understand that you are fully responsible for payment for all services provided by Provider(s) or through use of the Service and that you may not be able to use third-party insurance. 16. You represent that (a) you have read this Telehealth Consent carefully, (b) you understand the risks and benefits of the Service and the use of Telehealth in the medical care and treatment provided to you by Provider(s) using the Service, and (c) you have the legal capacity and authority to provide this consent for yourself and/or the minor for which you are consenting under applicable federal and state laws, including laws relating to the age of [de-identified] and/or parental/guardian consent.    17. You give your informed consent to the use of Telehealth by Provider(s) using the Service under

## 2022-05-26 NOTE — TELEPHONE ENCOUNTER
Download report reviewed. There is only 1 day of data on this report. Report is from [facility], is patient using his older system one CPAP? If so we will need report from the CPAP he is using currently and he may need to take it in for a download.

## 2022-05-27 NOTE — TELEPHONE ENCOUNTER
Spoke to patient and he said that he is using old machine. Patient said that he took machine to [employer] on 5/23/22 for download.      Sent an email to Chon Gonzalez asking for report

## 2022-06-08 NOTE — TELEPHONE ENCOUNTER
Pt stated he is using his old one on his overnight trips 2 times a week and his new one while he is home. He stated he took old machine in for download but the DME company download the wrong machine. Pt states they downloaded the new one that he has only used one day. Pt states he will take his old one back to DME for download again hopefully by next week.

## 2022-06-08 NOTE — TELEPHONE ENCOUNTER
Patient called with message left for patient to call back to office. Calling to verify with patient which machine he took in for download as report we rec'd only has 1 day of data and it is from a [employer]?

## 2022-07-15 NOTE — TELEPHONE ENCOUNTER
CPAP download report from 6/10/2022 - 7/9/2022 on CPAP 15 cm H2O reviewed. Compliance is adequate 80%.   [facility] is good one-point

## 2023-05-26 ENCOUNTER — TELEPHONE (OUTPATIENT)
Dept: PULMONOLOGY | Age: 63
End: 2023-05-26